# Patient Record
Sex: FEMALE | Race: WHITE | NOT HISPANIC OR LATINO | Employment: UNEMPLOYED | URBAN - METROPOLITAN AREA
[De-identification: names, ages, dates, MRNs, and addresses within clinical notes are randomized per-mention and may not be internally consistent; named-entity substitution may affect disease eponyms.]

---

## 2018-05-11 ENCOUNTER — OFFICE VISIT (OUTPATIENT)
Dept: URGENT CARE | Facility: CLINIC | Age: 43
End: 2018-05-11
Payer: MEDICARE

## 2018-05-11 VITALS
SYSTOLIC BLOOD PRESSURE: 176 MMHG | BODY MASS INDEX: 41.95 KG/M2 | OXYGEN SATURATION: 98 % | DIASTOLIC BLOOD PRESSURE: 8 MMHG | WEIGHT: 293 LBS | TEMPERATURE: 99 F | HEIGHT: 70 IN | HEART RATE: 103 BPM | RESPIRATION RATE: 16 BRPM

## 2018-05-11 DIAGNOSIS — L03.114 CELLULITIS OF LEFT ARM: Primary | ICD-10-CM

## 2018-05-11 PROCEDURE — 99203 OFFICE O/P NEW LOW 30 MIN: CPT | Mod: S$GLB,,, | Performed by: FAMILY MEDICINE

## 2018-05-11 PROCEDURE — 3008F BODY MASS INDEX DOCD: CPT | Mod: CPTII,S$GLB,, | Performed by: FAMILY MEDICINE

## 2018-05-11 RX ORDER — CLINDAMYCIN HYDROCHLORIDE 150 MG/1
150 CAPSULE ORAL EVERY 8 HOURS
Qty: 15 CAPSULE | Refills: 0 | Status: SHIPPED | OUTPATIENT
Start: 2018-05-11 | End: 2018-05-16

## 2018-05-11 NOTE — PROGRESS NOTES
"Subjective:       Patient ID: Iliana Hirsch is a 42 y.o. female.    Vitals:  height is 5' 10" (1.778 m) and weight is 158.8 kg (350 lb) (abnormal). Her oral temperature is 98.9 °F (37.2 °C). Her blood pressure is 176/8 (abnormal) and her pulse is 103. Her respiration is 16 and oxygen saturation is 98%.     Chief Complaint: Rash    Rash   This is a new problem. The current episode started in the past 7 days. The problem has been gradually worsening since onset. The affected locations include the left arm. The rash is characterized by redness and swelling. She was exposed to an insect bite/sting. Past treatments include nothing. The treatment provided no relief.     Review of Systems   Skin: Positive for rash.       Objective:      Physical Exam   Constitutional: She appears well-developed and well-nourished.   HENT:   Head: Normocephalic and atraumatic.   Right Ear: External ear normal.   Left Ear: External ear normal.   Nose: Nose normal.   Mouth/Throat: Oropharynx is clear and moist. No oropharyngeal exudate.   Eyes: Conjunctivae are normal. Right eye exhibits no discharge. Left eye exhibits no discharge.   Cardiovascular: Normal rate, regular rhythm and normal heart sounds.    Pulmonary/Chest: Effort normal and breath sounds normal. She has no wheezes.   Skin: Skin is warm and dry.   Left forearm with 2 cm erythema, no induration or fluctuance or drainage   Psychiatric: She has a normal mood and affect. Her behavior is normal.   Vitals reviewed.      Assessment:       1. Cellulitis of left arm        Plan:         Cellulitis of left arm    Other orders  -     clindamycin (CLEOCIN HCL) 150 MG capsule; Take 1 capsule (150 mg total) by mouth every 8 (eight) hours.  Dispense: 15 capsule; Refill: 0          "

## 2018-05-14 ENCOUNTER — TELEPHONE (OUTPATIENT)
Dept: URGENT CARE | Facility: CLINIC | Age: 43
End: 2018-05-14

## 2020-03-31 ENCOUNTER — NURSE TRIAGE (OUTPATIENT)
Dept: ADMINISTRATIVE | Facility: CLINIC | Age: 45
End: 2020-03-31

## 2020-03-31 NOTE — TELEPHONE ENCOUNTER
"Pt does not have PCP. Woke up this AM and felt fine.  At 0900 started to feel "shaky and weak" with temp 100.1 F and sweating. Took 2 tylenol with good relief. Temp recheck 98.6 F. C/o headache for past 2 weeks and pt states she suffers from sinus headaches. Pt states she is very anxious about COVID19. Advised to call back if symptoms worsen.  Verbalizes understanding of care advice and disposition. No further questions.    Reason for Disposition   Fever with no signs of serious infection or localizing symptoms   No COVID-19 EXPOSURE, but has questions about    Additional Information   Negative: Severe difficulty breathing (e.g., struggling for each breath, speak in single words, bluish lips)   Negative: Sounds like a life-threatening emergency to the triager   Negative: Difficulty breathing (shortness of breath) occurs and onset > 14 days after COVID-19 EXPOSURE (Close Contact)   Negative: Cough occurs and onset > 14 days after COVID-19 EXPOSURE   Negative: Common cold symptoms and onset > 14 days after COVID-19 EXPOSURE   Negative: Difficulty breathing occurs within 14 days of COVID-19 EXPOSURE   Negative: Patient sounds very sick or weak to the triager   Negative: Fever or feeling feverish within 14 Days of COVID-19 EXPOSURE   Negative: Cough occurs within 14 days of COVID-19 EXPOSURE   Negative: Mild body aches, chills, diarrhea, headache, runny nose, or sore throat occur within 14 days of COVID-19 EXPOSURE   Negative: COVID-19 EXPOSURE within last 14 days AND NO cough, fever, or breathing difficulty AND exposed person is a healthcare worker who was NOT using all recommended personal protective equipment (i.e., a respirator-N95 mask, eye protection, gloves, and gown)   Negative: Fever (or feeling feverish) or symptoms of lower respiratory illness (e.g., cough, difficulty breathing) AND TRAVEL FROM [place] (or other CDC identified high risk travel area) within last 14 days   Negative: COVID-19 " EXPOSURE within last 14 days AND NO cough, fever, or breathing difficulty   Negative: TRAVEL FROM [place] (or other CDC identified high risk travel area) within last 14 days AND NO cough or fever or breathing difficulty   Negative: COVID-19 EXPOSURE 15 or more days ago AND NO cough or fever or breathing difficulty   Negative: Difficult to awaken or acting confused (e.g., disoriented, slurred speech)   Negative: Pale cold skin and very weak (can't stand)   Negative: Difficulty breathing and bluish (or gray) lips or face   Negative: New onset rash with purple (or blood-colored) spots or dots   Negative: Sounds like a life-threatening emergency to the triager   Negative: Fever onset within 24 hours of receiving vaccine   Negative: Fever within 21 days of Ebola exposure   Negative: Headache and stiff neck (can't touch chin to chest)   Negative: Difficulty breathing   Negative: IV drug abuse   Negative: Fever > 103 F (39.4 C)   Negative: Fever > 101 F (38.3 C) and over 60 years of age   Negative: Fever > 100.0 F (37.8 C) and diabetes mellitus or a weak immune system (e.g., HIV positive, chemotherapy, splenectomy)   Negative: Fever > 100.0 F (37.8 C) and bedridden (e.g., nursing home patient, stroke, chronic illness, recovering from surgery)   Negative: Fever > 100.0 F (37.8 C) and indwelling urinary catheter (e.g., Mooney, coude)   Negative: Fever > 100.5 F (38.1 C) and has port (portacath), central line, or PICC line   Negative: Drinking very little and has signs of dehydration (e.g., no urine > 12 hours, very dry mouth, very lightheaded)   Negative: Patient sounds very sick or weak to the triager   Negative: Fever > 100.5 F (38.1 C) and surgery in the past month   Negative: Transplant patient (e.g., liver, heart, lung, kidney)   Negative: Widespread rash and cause unknown   Negative: Patient wants to be seen   Negative: Fever present > 3 days (72 hours)   Negative: Intermittent fever > 100.5 F  persists > 3 weeks   Negative: Fever > 100.0 F (37.8 C) and foreign travel to a developing country in the past month    Protocols used: FEVER-A-[place], CORONAVIRUS (COVID-19) EXPOSURE-A-[place]

## 2024-01-09 PROBLEM — M75.01 ADHESIVE CAPSULITIS OF RIGHT SHOULDER: Status: ACTIVE | Noted: 2024-01-09
